# Patient Record
Sex: MALE | Race: OTHER | HISPANIC OR LATINO | URBAN - METROPOLITAN AREA
[De-identification: names, ages, dates, MRNs, and addresses within clinical notes are randomized per-mention and may not be internally consistent; named-entity substitution may affect disease eponyms.]

---

## 2020-07-24 ENCOUNTER — EMERGENCY (EMERGENCY)
Facility: HOSPITAL | Age: 20
LOS: 0 days | Discharge: HOME | End: 2020-07-24
Attending: EMERGENCY MEDICINE
Payer: MEDICAID

## 2020-07-24 VITALS
HEIGHT: 67 IN | DIASTOLIC BLOOD PRESSURE: 69 MMHG | TEMPERATURE: 98 F | HEART RATE: 102 BPM | RESPIRATION RATE: 18 BRPM | SYSTOLIC BLOOD PRESSURE: 156 MMHG | OXYGEN SATURATION: 99 %

## 2020-07-24 VITALS — WEIGHT: 180.12 LBS

## 2020-07-24 DIAGNOSIS — Z87.891 PERSONAL HISTORY OF NICOTINE DEPENDENCE: ICD-10-CM

## 2020-07-24 DIAGNOSIS — R07.9 CHEST PAIN, UNSPECIFIED: ICD-10-CM

## 2020-07-24 LAB
ALBUMIN SERPL ELPH-MCNC: 3.8 G/DL — SIGNIFICANT CHANGE UP (ref 3.3–5)
ALP SERPL-CCNC: 92 U/L — SIGNIFICANT CHANGE UP (ref 40–120)
ALT FLD-CCNC: 64 U/L — SIGNIFICANT CHANGE UP (ref 12–78)
ANION GAP SERPL CALC-SCNC: 9 MMOL/L — SIGNIFICANT CHANGE UP (ref 5–17)
AST SERPL-CCNC: 85 U/L — HIGH (ref 15–37)
BASOPHILS # BLD AUTO: 0.02 K/UL — SIGNIFICANT CHANGE UP (ref 0–0.2)
BASOPHILS NFR BLD AUTO: 0.2 % — SIGNIFICANT CHANGE UP (ref 0–2)
BILIRUB SERPL-MCNC: 0.3 MG/DL — SIGNIFICANT CHANGE UP (ref 0.2–1.2)
BUN SERPL-MCNC: 10 MG/DL — SIGNIFICANT CHANGE UP (ref 7–23)
CALCIUM SERPL-MCNC: 8.6 MG/DL — SIGNIFICANT CHANGE UP (ref 8.5–10.1)
CHLORIDE SERPL-SCNC: 104 MMOL/L — SIGNIFICANT CHANGE UP (ref 96–108)
CO2 SERPL-SCNC: 26 MMOL/L — SIGNIFICANT CHANGE UP (ref 22–31)
CREAT SERPL-MCNC: 1.13 MG/DL — SIGNIFICANT CHANGE UP (ref 0.5–1.3)
EOSINOPHIL # BLD AUTO: 0.29 K/UL — SIGNIFICANT CHANGE UP (ref 0–0.5)
EOSINOPHIL NFR BLD AUTO: 3.1 % — SIGNIFICANT CHANGE UP (ref 0–6)
GLUCOSE SERPL-MCNC: 139 MG/DL — HIGH (ref 70–99)
HCT VFR BLD CALC: 44.2 % — SIGNIFICANT CHANGE UP (ref 39–50)
HGB BLD-MCNC: 14.8 G/DL — SIGNIFICANT CHANGE UP (ref 13–17)
IMM GRANULOCYTES NFR BLD AUTO: 0.4 % — SIGNIFICANT CHANGE UP (ref 0–1.5)
LYMPHOCYTES # BLD AUTO: 3.99 K/UL — HIGH (ref 1–3.3)
LYMPHOCYTES # BLD AUTO: 43.1 % — SIGNIFICANT CHANGE UP (ref 13–44)
MAGNESIUM SERPL-MCNC: 2 MG/DL — SIGNIFICANT CHANGE UP (ref 1.6–2.6)
MCHC RBC-ENTMCNC: 28.8 PG — SIGNIFICANT CHANGE UP (ref 27–34)
MCHC RBC-ENTMCNC: 33.5 GM/DL — SIGNIFICANT CHANGE UP (ref 32–36)
MCV RBC AUTO: 86.2 FL — SIGNIFICANT CHANGE UP (ref 80–100)
MONOCYTES # BLD AUTO: 0.64 K/UL — SIGNIFICANT CHANGE UP (ref 0–0.9)
MONOCYTES NFR BLD AUTO: 6.9 % — SIGNIFICANT CHANGE UP (ref 2–14)
NEUTROPHILS # BLD AUTO: 4.28 K/UL — SIGNIFICANT CHANGE UP (ref 1.8–7.4)
NEUTROPHILS NFR BLD AUTO: 46.3 % — SIGNIFICANT CHANGE UP (ref 43–77)
PLATELET # BLD AUTO: 229 K/UL — SIGNIFICANT CHANGE UP (ref 150–400)
POTASSIUM SERPL-MCNC: 3.1 MMOL/L — LOW (ref 3.5–5.3)
POTASSIUM SERPL-SCNC: 3.1 MMOL/L — LOW (ref 3.5–5.3)
PROT SERPL-MCNC: 7.4 GM/DL — SIGNIFICANT CHANGE UP (ref 6–8.3)
RBC # BLD: 5.13 M/UL — SIGNIFICANT CHANGE UP (ref 4.2–5.8)
RBC # FLD: 12.1 % — SIGNIFICANT CHANGE UP (ref 10.3–14.5)
SARS-COV-2 RNA SPEC QL NAA+PROBE: SIGNIFICANT CHANGE UP
SODIUM SERPL-SCNC: 139 MMOL/L — SIGNIFICANT CHANGE UP (ref 135–145)
TROPONIN I SERPL-MCNC: <0.015 NG/ML — SIGNIFICANT CHANGE UP (ref 0.01–0.04)
TROPONIN I SERPL-MCNC: <0.015 NG/ML — SIGNIFICANT CHANGE UP (ref 0.01–0.04)
WBC # BLD: 9.26 K/UL — SIGNIFICANT CHANGE UP (ref 3.8–10.5)
WBC # FLD AUTO: 9.26 K/UL — SIGNIFICANT CHANGE UP (ref 3.8–10.5)

## 2020-07-24 PROCEDURE — 71046 X-RAY EXAM CHEST 2 VIEWS: CPT | Mod: 26

## 2020-07-24 PROCEDURE — U0003: CPT

## 2020-07-24 PROCEDURE — 93005 ELECTROCARDIOGRAM TRACING: CPT

## 2020-07-24 PROCEDURE — 80053 COMPREHEN METABOLIC PANEL: CPT

## 2020-07-24 PROCEDURE — 84484 ASSAY OF TROPONIN QUANT: CPT

## 2020-07-24 PROCEDURE — 71046 X-RAY EXAM CHEST 2 VIEWS: CPT

## 2020-07-24 PROCEDURE — 99284 EMERGENCY DEPT VISIT MOD MDM: CPT | Mod: 25

## 2020-07-24 PROCEDURE — 99284 EMERGENCY DEPT VISIT MOD MDM: CPT

## 2020-07-24 PROCEDURE — 83735 ASSAY OF MAGNESIUM: CPT

## 2020-07-24 PROCEDURE — 93010 ELECTROCARDIOGRAM REPORT: CPT

## 2020-07-24 PROCEDURE — 36415 COLL VENOUS BLD VENIPUNCTURE: CPT

## 2020-07-24 PROCEDURE — 99053 MED SERV 10PM-8AM 24 HR FAC: CPT

## 2020-07-24 PROCEDURE — 96374 THER/PROPH/DIAG INJ IV PUSH: CPT

## 2020-07-24 PROCEDURE — 85025 COMPLETE CBC W/AUTO DIFF WBC: CPT

## 2020-07-24 RX ORDER — KETOROLAC TROMETHAMINE 30 MG/ML
15 SYRINGE (ML) INJECTION ONCE
Refills: 0 | Status: DISCONTINUED | OUTPATIENT
Start: 2020-07-24 | End: 2020-07-24

## 2020-07-24 RX ORDER — POTASSIUM CHLORIDE 20 MEQ
40 PACKET (EA) ORAL ONCE
Refills: 0 | Status: COMPLETED | OUTPATIENT
Start: 2020-07-24 | End: 2020-07-24

## 2020-07-24 RX ADMIN — Medication 40 MILLIEQUIVALENT(S): at 05:42

## 2020-07-24 RX ADMIN — Medication 15 MILLIGRAM(S): at 02:36

## 2020-07-24 NOTE — ED PROVIDER NOTE - OBJECTIVE STATEMENT
19 y/o M with no pertinent PMHx p/w sudden onset of left sternal border chest pressure while laying supine and attempting to sleep.  Pt denies SOB or GONZALEZ.  Pt states pain is worse with deep breaths.  Pt denies recent illness, f/c/r, cough, abd pain, n/v/d.  Pt denies sick contacts or recent travel.  No leg pain/swelling.  Pt didn't take any meds for pain PTA.

## 2020-07-24 NOTE — ED ADULT TRIAGE NOTE - CHIEF COMPLAINT QUOTE
patient c/o chest pain, difficulty breathing, shaking, and feeling cold. symptoms started 30 minutes prior to arrival. no respiratory distress at triage. states he had similar symptoms one year ago.

## 2020-07-24 NOTE — ED ADULT NURSE NOTE - OBJECTIVE STATEMENT
pt states he developed some chest pain, chills and sob that started about 45 mins ago. pt states pain is in epigastric area and towards left side of chest. pt denies taking med pta

## 2020-07-24 NOTE — ED PROVIDER NOTE - CARE PROVIDER_API CALL
Iker Rincon  CARDIOVASCULAR DISEASE  [address] [place] [address]  Phone: [phone]  Fax: [phone]  Follow Up Time: 1-3 Days

## 2020-07-24 NOTE — ED PROVIDER NOTE - PATIENT PORTAL LINK FT
You can access the FollowMyHealth Patient Portal offered by [facility] by registering at the following website: [email or web address]. By joining [employer]’s FollowMyHealth portal, you will also be able to view your health information using other applications (apps) compatible with our system.

## 2020-07-24 NOTE — ED PROVIDER NOTE - NSFOLLOWUPINSTRUCTIONS_ED_ALL_ED_FT
Dolor de pecho inespecífico  Nonspecific Chest Pain    El dolor de pecho puede deberse a muchas afecciones diferentes. Siempre existe gertrudis posibilidad de que el dolor esté relacionado con algo grave, luan un infarto de miocardio o un coágulo sanguíneo en los pulmones. Hay diferentes afecciones que no son potencialmente mortales que pueden causar dolor de pecho. Si tiene dolor de pecho, es muy importante que se controle con el médico.     ¿Cuáles son las causas?  Entre las causas de esta afección se incluyen las siguientes:    Acidez estomacal.  Neumonía o bronquitis.  Ansiedad o estrés.  Inflamación de la leslie que rodea el corazón (pericarditis) o los pulmones (pleuritis o pleuresía).  Un coágulo sanguíneo en el pulmón.  Pulmón colapsado (neumotórax). Puede aparecer de manera repentina por sí solo (neumotórax espontáneo) o debido a un traumatismo en el tórax.  Culebrilla (virus de la varicela zóster).  Infarto de miocardio.  Daño de los huesos, los músculos y los cartílagos que conforman la pared torácica. [place] puede incluir lo siguiente:  Hematomas óseos debido a lesiones.  Distensiones musculares o de los cartílagos por tos frecuente o repetida, o por exceso de trabajo.  Fractura de gertrudis o más costillas.  Dolor de cartílago debido a inflamación (costocondritis).    ¿Qué incrementa el riesgo?  Entre los factores de riesgo de esta afección se pueden incluir los siguientes:    Actividades que incrementan el riesgo de sufrir traumatismos o lesiones en el tórax.  Infecciones o enfermedades respiratorias que causan tos frecuente.  Afecciones o excesos en las comidas que pueden causar acidez estomacal.  Cardiopatías coronarias o antecedentes familiares de enfermedades cardíacas.  Afecciones o comportamientos de milly que aumentan el riesgo de tener un coágulo sanguíneo.  Shubham tenido varicela (varicela zóster).    ¿Cuáles son los signos o los síntomas?  El dolor de pecho puede provocar las siguientes sensaciones:    Ardor u hormigueo en la superficie o en lo profundo del pecho.  Dolor opresivo, continuo o constrictivo.  Dolor vago o intenso que empeora al moverse, toser o inhalar profundamente.  Dolor que también se siente en la espalda, el elizabeth, el hombro o el brazo, o dolor que se extiende a cualquiera de estas zonas.    El dolor de pecho puede aparecer y desaparecer, o eryn puede ser carmen.    ¿Cómo se diagnostica?  Quizás se necesiten análisis de laboratorio u otros estudios para encontrar la causa del dolor. El médico puede indicarle que se liyah gertrudis prueba llamada ECG (electrocardiograma). El electrocardiograma registra los patrones de los latidos cardíacos en el momento en que se realiza el estudio. También pueden hacerle otros estudios, por ejemplo:    Ecocardiograma transtorácico (ETT). En margarette estudio, se usan ondas sonoras para crear gertrudis imagen de las estructuras cardíacas y evaluar cómo circula la kaylan por el corazón.  Ecocardiografía transesofágica (ETE). Margarette es un estudio de diagnóstico por imágenes más avanzado mediante el cual se williams imágenes del interior del cuerpo. Le permite al médico costa el corazón con mayor detalle.  Monitoreo cardíaco. Permite que el médico controle la frecuencia y el ritmo cardíacos en tiempo real.  Monitor Holter. Es un dispositivo portátil que registra los latidos del corazón y puede ayudar a diagnosticar los latidos cardíacos anómalos. Le permite al médico registrar la actividad cardíaca inder varios días, si es necesario.  Pruebas de esfuerzo. Estas pueden realizarse inder el ejercicio o mediante la administración de un medicamento que acelera los latidos del corazón.  Análisis de kaylan.  Otros estudios de diagnóstico por imágenes.    ¿Cómo se trata?  El tratamiento depende de la causa del dolor de pecho. El tratamiento puede incluir lo siguiente:    Medicamentos. Estos pueden incluir, entre otros, los siguientes:  Inhibidores de la acidez estomacal.  Antiinflamatorios.  Analgésicos para las enfermedades inflamatorias.  Antibióticos, si hay gertrudis infección.  Medicamentos para disolver los coágulos sanguíneos.  Medicamentos para tratar la enfermedad arterial coronaria (EAC).  Tratamiento complementario para las enfermedades que no requieren la chiqui de medicamentos. Algunas opciones de margarette tipo de tratamiento incluyen las siguientes:  Hacer reposo.  Aplicar compresas frías o calientes en las zonas lesionadas.  Limitar las actividades hasta que disminuya el dolor.    Siga estas indicaciones en fierro casa:  Medicamentos    Si le recetaron un antibiótico, tómelo luan se lo haya indicado el médico. No deje de lili el antibiótico aunque comience a sentirse mejor.  [place] los medicamentos de venta randy y los recetados solamente luan se lo haya indicado el médico.    Estilo de claribel    No consuma ningún producto que contenga nicotina o tabaco, luan cigarrillos y cigarrillos electrónicos. Si necesita ayuda para dejar de fumar, consulte al médico.  No saira alcohol.  Liyah cambios en fierro estilo de claribel luan se lo haya indicado el médico. Estos pueden incluir, entre otros, los siguientes:   Practicar actividad física con regularidad. Pida al médico que le sugiera algunas actividades que caden seguras para usted.  Consumir gertrudis dieta cardiosaludable. Un nutricionista matriculado puede ayudarlo a hacer elecciones saludables.  Mantener un peso saludable.  Controlar la diabetes, si es necesario.  Disminuir el nivel de estrés; por ejemplo, con yoga o técnicas de relajación.    Instrucciones generales    Evite las actividades que le causen dolor de pecho.  Si la causa del dolor de pecho es la acidez estomacal, levante (eleve) la cabecera de la cama aproximadamente 6 pulgadas (15 cm) colocando bloques debajo de las patas. Usar más almohadas para dormir no es efectivo para aliviar la acidez estomacal porque solo modificaría la posición de la gracia.  Concurra a todas las visitas de seguimiento luan se lo haya indicado el médico. [place] es importante. [place] incluye otros estudios si el dolor de pecho no desaparece.    Comuníquese con un médico si:  El dolor de pecho no desaparece.  Tiene gertrudis erupción cutánea con ampollas en el pecho.  Tiene fiebre.  Tiene escalofríos.    Solicite ayuda de inmediato si:  El dolor en el pecho es más intenso.  Tiene tos que empeora o tose con kaylan.  Siente un dolor intenso en el abdomen.  Siente debilidad intensa.  Se desmaya.  Tiene gertrudis molestia repentina e inexplicable en el pecho.  Tiene molestias repentinas e inexplicables en los brazos, la espalda, el elizabeth o la mandíbula.  Le falta el aire en cualquier momento.  Comienza a sudar de manera repentina o la piel se le humedece.  Siente náuseas o vomita.  Se siente repentinamente mareado o se desmaya.  Siente que el corazón comienza a latir rápidamente o que se saltea latidos.    Estos síntomas pueden representar un problema grave que constituye gertrudis emergencia. No espere hasta que los síntomas desaparezcan. Solicite atención médica de inmediato. Comuníquese con el servicio de emergencias de fierro localidad (911 en los [place]). No conduzca por johnny propios medios hasta el hospital.

## 2023-09-13 ENCOUNTER — EMERGENCY (EMERGENCY)
Facility: HOSPITAL | Age: 23
LOS: 0 days | Discharge: HOME | End: 2023-09-13
Attending: EMERGENCY MEDICINE
Payer: COMMERCIAL

## 2023-09-13 VITALS
SYSTOLIC BLOOD PRESSURE: 148 MMHG | RESPIRATION RATE: 18 BRPM | HEART RATE: 99 BPM | DIASTOLIC BLOOD PRESSURE: 103 MMHG | TEMPERATURE: 99 F | WEIGHT: 164.91 LBS | OXYGEN SATURATION: 96 % | HEIGHT: 68 IN

## 2023-09-13 VITALS
RESPIRATION RATE: 18 BRPM | TEMPERATURE: 98 F | HEART RATE: 77 BPM | OXYGEN SATURATION: 100 % | DIASTOLIC BLOOD PRESSURE: 68 MMHG | SYSTOLIC BLOOD PRESSURE: 118 MMHG
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
## 2023-09-13 DIAGNOSIS — R07.89 OTHER CHEST PAIN: ICD-10-CM

## 2023-09-13 DIAGNOSIS — S29.011A STRAIN OF MUSCLE AND TENDON OF FRONT WALL OF THORAX, INITIAL ENCOUNTER: ICD-10-CM
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
## 2023-09-13 PROBLEM — Z78.9 OTHER SPECIFIED HEALTH STATUS: Chronic | Status: ACTIVE | Noted: 2020-07-24

## 2023-09-13 PROCEDURE — 71045 X-RAY EXAM CHEST 1 VIEW: CPT

## 2023-09-13 PROCEDURE — 99053 MED SERV 10PM-8AM 24 HR FAC: CPT

## 2023-09-13 PROCEDURE — 99283 EMERGENCY DEPT VISIT LOW MDM: CPT | Mod: 25

## 2023-09-13 PROCEDURE — 99284 EMERGENCY DEPT VISIT MOD MDM: CPT

## 2023-09-13 PROCEDURE — 71045 X-RAY EXAM CHEST 1 VIEW: CPT | Mod: 26

## 2023-09-13 RX ORDER — IBUPROFEN 200 MG
600 TABLET ORAL ONCE
Refills: 0 | Status: COMPLETED | OUTPATIENT
Start: 2023-09-13 | End: 2023-09-13

## 2023-09-13 RX ORDER — ACETAMINOPHEN 500 MG
975 TABLET ORAL ONCE
Refills: 0 | Status: COMPLETED | OUTPATIENT
Start: 2023-09-13 | End: 2023-09-13

## 2023-09-13 RX ADMIN — Medication 975 MILLIGRAM(S): at 01:45

## 2023-09-13 RX ADMIN — Medication 600 MILLIGRAM(S): at 04:50

## 2023-09-13 NOTE — ED PROVIDER NOTE - PATIENT PORTAL LINK FT
You can access the FollowMyHealth Patient Portal offered by [facility] by registering at the following website: [email or web address]. By joining [employer]’s FollowMyHealth portal, you will also be able to view your health information using other applications (apps) compatible with our system.

## 2023-09-13 NOTE — ED PROVIDER NOTE - NSFOLLOWUPINSTRUCTIONS_ED_ALL_ED_FT
Paciente: GENER RIOSACOSTA  Profesional que asiste al paciente: Lalo, Silver J  Distensión muscular  Muscle Strain    Gertrudis distensión muscular es gertrudis lesión que se produce cuando un músculo se estira más allá de fierro maru normal. Cuando esto sucede, por lo general, se desgarra gertrudis pequeña cantidad de fibras musculares. Hay lorenzo tipos de distensiones musculares. Las distensiones de primer john son aquellas en las cuales el desgarro afecta a la yocasta cantidad de fibras musculares y las menos dolorosas. Las distensiones de garrett y tercer john involucran gertrudis proporción cada vez mayor de desgarro y dolor.    En general, la recuperación de gertrudis distensión muscular tarda de 1 a 2 semanas. La recuperación completa normalmente tarda de 5 a 6 semanas.    ¿Cuáles son las causas?  Esta afección ocurre cuando se aplica gertrudis fuerza violenta y súbita sobre un músculo y valeri se estira demasiado. [place] puede ocurrir inder gertrudis caída, cuando se levantan objetos o cuando se practican deportes.    ¿Qué incrementa el riesgo?  Es más probable que esta afección se manifieste en atletas y personas físicamente activas.    ¿Cuáles son los signos o síntomas?  Los síntomas de esta afección incluyen:    Dolor.  Moretones.  Hinchazón.  Dificultad cuando se usa el músculo.    ¿Cómo se diagnostica?  Esta afección se diagnostica en función de un examen físico y de los antecedentes médicos. También se pueden hacer estudios luan radiografía, ecografía o resonancia magnética (RM).    ¿Cómo se trata?  Inicialmente, se trata con terapia PRICE (protección, reposo, hielo, compresión, elevación). Esta terapia incluye lo siguiente:    Proteger al músculo de nuevas lesiones.  Reposo del músculo lesionado.  Aplicación de hielo en el músculo lesionado.  Aplicación de presión (compresión) en el músculo lesionado. [place] se puede hacer con gertrudis férula o gertrudis venda elástica.  Elevación del músculo lesionado.    El médico también puede recomendarle analgésicos.    Siga estas instrucciones en fierro casa:      Si tiene gertrudis férula:    Use la férula luan se lo haya indicado el médico. Quítesela solamente luan se lo haya indicado el médico.   Afloje la férula si los dedos de las shawna o de los pies se le entumecen, siente hormigueos o se le enfrían y se tornan de color mahendra.  Mantenga la férula limpia.  Si la férula no es impermeable:    No deje que se moje.  Cúbrala con un envoltorio hermético cuando tome un baño de inmersión o gertrudis ducha.        Control del dolor, la rigidez y la hinchazón     Si se lo indican, aplique hielo sobre la leslie de la lesión.    Si tiene gertrudis férula desmontable, quítesela luan se lo haya indicado el médico.  Ponga el hielo en gertrudis bolsa plástica.  Coloque gertrudis toalla entre la piel y la bolsa.  Aplique el hielo inder 20 minutos, 2 o 3 veces por día.  Mueva los dedos de la mano o del pie con frecuencia para evitar la rigidez y reducir la hinchazón.  Cuando esté sentado o acostado, alce (eleve) la leslie de la lesión por encima del nivel del corazón.  Use gertrudis venda elástica luan se lo haya indicado el médico. Asegúrese de no ajustarla demasiado.        Instrucciones generales    El tratamiento puede incluir medicamentos para el dolor y la inflamación que se williams por boca o que se aplican sobre la piel, analgésicos recetados o relajantes musculares. Use los medicamentos de venta randy y los recetados solamente luan se lo haya indicado el médico.  Restrinja las actividades y inna reposo del músculo lesionado según las indicaciones del médico. Posiblemente le permitan hacer movimientos suaves.  Si le indicaron fisioterapia, inna los ejercicios luan se lo haya indicado el médico.  No ejerza presión en ninguna parte de la férula hasta que se haya endurecido por completo. [place] puede tardar varias horas.  No consuma ningún producto que contenga nicotina o tabaco, luan cigarrillos y cigarrillos electrónicos. Estos pueden retrasar la consolidación del hueso. Si necesita ayuda para dejar de fumar, consulte al médico.  Pregúntele al médico cuándo puede volver a conducir si tiene gertrudis férula.  Concurra a todas las visitas de seguimiento luan se lo haya indicado el médico. [place] es importante.    ¿Cómo se ted?  Precaliente antes de la actividad física. [place] ayuda a prevenir futuras distensiones musculares.    Comuníquese con un médico si:  Siente más dolor o tiene más hinchazón en la leslie lesionada.    Solicite ayuda de inmediato si:  Siente adormecimiento, hormigueo o nota gertrudis pérdida importante de fuerza en la leslie lesionada.    Resumen  Gertrudis distensión muscular es gertrudis lesión que se produce cuando un músculo se estira más allá de fierro maru normal.  Esta afección ocurre cuando se aplica gertrudis fuerza violenta y súbita sobre un músculo y valeri se estira demasiado.  Esta afección se trata inicialmente con terapia PRICE, que significa protección, reposo, hielo, compresión y elevación.  Posiblemente le permitan hacer movimientos suaves. Si le indicaron fisioterapia, inna los ejercicios luan se lo haya indicado el médico.    NOTAS ADICIONALES E INSTRUCCIONES    Please follow up with your Primary MD in 24-48 hr.  Seek immediate medical care for any new/worsening signs or symptoms.     Document Released: 12/18/2006 Document Revised: 10/15/2020 Document Reviewed: 1/24/2018  [employer] Interactive Patient Education ©2019 [employer]. This information is not intended to replace advice given to you by your health care provider. Make sure you discuss any questions you have with your health care provider.

## 2023-09-13 NOTE — ED PROVIDER NOTE - CLINICAL SUMMARY MEDICAL DECISION MAKING FREE TEXT BOX
Patient no acute distress ambulating in the ED advised acetaminophen ibuprofen for pain no hemoptysis return to ED for intractable chest pain shortness of breath or any overall worsening
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
## 2023-09-13 NOTE — ED PROVIDER NOTE - CARE PLAN
1 Principal Discharge DX:	Strain of thoracic back region   Principal Discharge DX:	Strain of left pectoralis muscle

## 2023-10-27 ENCOUNTER — EMERGENCY (EMERGENCY)
Facility: HOSPITAL | Age: 23
LOS: 0 days | Discharge: HOME | End: 2023-10-27
Attending: HOSPITALIST
Payer: MEDICAID

## 2023-10-27 VITALS
RESPIRATION RATE: 18 BRPM | SYSTOLIC BLOOD PRESSURE: 128 MMHG | DIASTOLIC BLOOD PRESSURE: 97 MMHG | OXYGEN SATURATION: 97 % | TEMPERATURE: 98 F | HEART RATE: 76 BPM

## 2023-10-27 VITALS — HEIGHT: 68 IN | WEIGHT: 164.02 LBS

## 2023-10-27 DIAGNOSIS — R07.0 PAIN IN THROAT: ICD-10-CM

## 2023-10-27 DIAGNOSIS — R07.89 OTHER CHEST PAIN: ICD-10-CM

## 2023-10-27 DIAGNOSIS — Z20.822 CONTACT WITH AND (SUSPECTED) EXPOSURE TO COVID-19: ICD-10-CM

## 2023-10-27 DIAGNOSIS — R00.2 PALPITATIONS: ICD-10-CM

## 2023-10-27 LAB
ALBUMIN SERPL ELPH-MCNC: 3.5 G/DL — SIGNIFICANT CHANGE UP (ref 3.3–5)
ALBUMIN SERPL ELPH-MCNC: 3.5 G/DL — SIGNIFICANT CHANGE UP (ref 3.3–5)
ALP SERPL-CCNC: 86 U/L — SIGNIFICANT CHANGE UP (ref 40–120)
ALP SERPL-CCNC: 86 U/L — SIGNIFICANT CHANGE UP (ref 40–120)
ALT FLD-CCNC: 35 U/L — SIGNIFICANT CHANGE UP (ref 12–78)
ALT FLD-CCNC: 35 U/L — SIGNIFICANT CHANGE UP (ref 12–78)
ANION GAP SERPL CALC-SCNC: 6 MMOL/L — SIGNIFICANT CHANGE UP (ref 5–17)
ANION GAP SERPL CALC-SCNC: 6 MMOL/L — SIGNIFICANT CHANGE UP (ref 5–17)
AST SERPL-CCNC: 16 U/L — SIGNIFICANT CHANGE UP (ref 15–37)
AST SERPL-CCNC: 16 U/L — SIGNIFICANT CHANGE UP (ref 15–37)
BASOPHILS # BLD AUTO: 0.05 K/UL — SIGNIFICANT CHANGE UP (ref 0–0.2)
BASOPHILS # BLD AUTO: 0.05 K/UL — SIGNIFICANT CHANGE UP (ref 0–0.2)
BASOPHILS NFR BLD AUTO: 0.4 % — SIGNIFICANT CHANGE UP (ref 0–2)
BASOPHILS NFR BLD AUTO: 0.4 % — SIGNIFICANT CHANGE UP (ref 0–2)
BILIRUB SERPL-MCNC: 0.3 MG/DL — SIGNIFICANT CHANGE UP (ref 0.2–1.2)
BILIRUB SERPL-MCNC: 0.3 MG/DL — SIGNIFICANT CHANGE UP (ref 0.2–1.2)
BUN SERPL-MCNC: 9 MG/DL — SIGNIFICANT CHANGE UP (ref 7–23)
BUN SERPL-MCNC: 9 MG/DL — SIGNIFICANT CHANGE UP (ref 7–23)
CALCIUM SERPL-MCNC: 8.8 MG/DL — SIGNIFICANT CHANGE UP (ref 8.5–10.1)
CALCIUM SERPL-MCNC: 8.8 MG/DL — SIGNIFICANT CHANGE UP (ref 8.5–10.1)
CHLORIDE SERPL-SCNC: 104 MMOL/L — SIGNIFICANT CHANGE UP (ref 96–108)
CHLORIDE SERPL-SCNC: 104 MMOL/L — SIGNIFICANT CHANGE UP (ref 96–108)
CO2 SERPL-SCNC: 29 MMOL/L — SIGNIFICANT CHANGE UP (ref 22–31)
CO2 SERPL-SCNC: 29 MMOL/L — SIGNIFICANT CHANGE UP (ref 22–31)
CREAT SERPL-MCNC: 1.11 MG/DL — SIGNIFICANT CHANGE UP (ref 0.5–1.3)
CREAT SERPL-MCNC: 1.11 MG/DL — SIGNIFICANT CHANGE UP (ref 0.5–1.3)
D DIMER BLD IA.RAPID-MCNC: <150 NG/ML DDU — SIGNIFICANT CHANGE UP
D DIMER BLD IA.RAPID-MCNC: <150 NG/ML DDU — SIGNIFICANT CHANGE UP
EGFR: 95 ML/MIN/1.73M2 — SIGNIFICANT CHANGE UP
EGFR: 95 ML/MIN/1.73M2 — SIGNIFICANT CHANGE UP
EOSINOPHIL # BLD AUTO: 0.07 K/UL — SIGNIFICANT CHANGE UP (ref 0–0.5)
EOSINOPHIL # BLD AUTO: 0.07 K/UL — SIGNIFICANT CHANGE UP (ref 0–0.5)
EOSINOPHIL NFR BLD AUTO: 0.5 % — SIGNIFICANT CHANGE UP (ref 0–6)
EOSINOPHIL NFR BLD AUTO: 0.5 % — SIGNIFICANT CHANGE UP (ref 0–6)
FLUAV AG NPH QL: SIGNIFICANT CHANGE UP
FLUAV AG NPH QL: SIGNIFICANT CHANGE UP
FLUBV AG NPH QL: SIGNIFICANT CHANGE UP
FLUBV AG NPH QL: SIGNIFICANT CHANGE UP
GLUCOSE SERPL-MCNC: 122 MG/DL — HIGH (ref 70–99)
GLUCOSE SERPL-MCNC: 122 MG/DL — HIGH (ref 70–99)
HCT VFR BLD CALC: 48.7 % — SIGNIFICANT CHANGE UP (ref 39–50)
HCT VFR BLD CALC: 48.7 % — SIGNIFICANT CHANGE UP (ref 39–50)
HGB BLD-MCNC: 16.3 G/DL — SIGNIFICANT CHANGE UP (ref 13–17)
HGB BLD-MCNC: 16.3 G/DL — SIGNIFICANT CHANGE UP (ref 13–17)
IMM GRANULOCYTES NFR BLD AUTO: 1 % — HIGH (ref 0–0.9)
IMM GRANULOCYTES NFR BLD AUTO: 1 % — HIGH (ref 0–0.9)
LYMPHOCYTES # BLD AUTO: 1.94 K/UL — SIGNIFICANT CHANGE UP (ref 1–3.3)
LYMPHOCYTES # BLD AUTO: 1.94 K/UL — SIGNIFICANT CHANGE UP (ref 1–3.3)
LYMPHOCYTES # BLD AUTO: 14.4 % — SIGNIFICANT CHANGE UP (ref 13–44)
LYMPHOCYTES # BLD AUTO: 14.4 % — SIGNIFICANT CHANGE UP (ref 13–44)
MCHC RBC-ENTMCNC: 28.4 PG — SIGNIFICANT CHANGE UP (ref 27–34)
MCHC RBC-ENTMCNC: 28.4 PG — SIGNIFICANT CHANGE UP (ref 27–34)
MCHC RBC-ENTMCNC: 33.5 GM/DL — SIGNIFICANT CHANGE UP (ref 32–36)
MCHC RBC-ENTMCNC: 33.5 GM/DL — SIGNIFICANT CHANGE UP (ref 32–36)
MCV RBC AUTO: 85 FL — SIGNIFICANT CHANGE UP (ref 80–100)
MCV RBC AUTO: 85 FL — SIGNIFICANT CHANGE UP (ref 80–100)
MONOCYTES # BLD AUTO: 0.79 K/UL — SIGNIFICANT CHANGE UP (ref 0–0.9)
MONOCYTES # BLD AUTO: 0.79 K/UL — SIGNIFICANT CHANGE UP (ref 0–0.9)
MONOCYTES NFR BLD AUTO: 5.9 % — SIGNIFICANT CHANGE UP (ref 2–14)
MONOCYTES NFR BLD AUTO: 5.9 % — SIGNIFICANT CHANGE UP (ref 2–14)
NEUTROPHILS # BLD AUTO: 10.49 K/UL — HIGH (ref 1.8–7.4)
NEUTROPHILS # BLD AUTO: 10.49 K/UL — HIGH (ref 1.8–7.4)
NEUTROPHILS NFR BLD AUTO: 77.8 % — HIGH (ref 43–77)
NEUTROPHILS NFR BLD AUTO: 77.8 % — HIGH (ref 43–77)
PLATELET # BLD AUTO: 288 K/UL — SIGNIFICANT CHANGE UP (ref 150–400)
PLATELET # BLD AUTO: 288 K/UL — SIGNIFICANT CHANGE UP (ref 150–400)
POTASSIUM SERPL-MCNC: 3.8 MMOL/L — SIGNIFICANT CHANGE UP (ref 3.5–5.3)
POTASSIUM SERPL-MCNC: 3.8 MMOL/L — SIGNIFICANT CHANGE UP (ref 3.5–5.3)
POTASSIUM SERPL-SCNC: 3.8 MMOL/L — SIGNIFICANT CHANGE UP (ref 3.5–5.3)
POTASSIUM SERPL-SCNC: 3.8 MMOL/L — SIGNIFICANT CHANGE UP (ref 3.5–5.3)
PROT SERPL-MCNC: 7.8 GM/DL — SIGNIFICANT CHANGE UP (ref 6–8.3)
PROT SERPL-MCNC: 7.8 GM/DL — SIGNIFICANT CHANGE UP (ref 6–8.3)
RBC # BLD: 5.73 M/UL — SIGNIFICANT CHANGE UP (ref 4.2–5.8)
RBC # BLD: 5.73 M/UL — SIGNIFICANT CHANGE UP (ref 4.2–5.8)
RBC # FLD: 12.8 % — SIGNIFICANT CHANGE UP (ref 10.3–14.5)
RBC # FLD: 12.8 % — SIGNIFICANT CHANGE UP (ref 10.3–14.5)
RSV RNA NPH QL NAA+NON-PROBE: SIGNIFICANT CHANGE UP
RSV RNA NPH QL NAA+NON-PROBE: SIGNIFICANT CHANGE UP
S PYO AG SPEC QL IA: NEGATIVE — SIGNIFICANT CHANGE UP
S PYO AG SPEC QL IA: NEGATIVE — SIGNIFICANT CHANGE UP
SARS-COV-2 RNA SPEC QL NAA+PROBE: SIGNIFICANT CHANGE UP
SARS-COV-2 RNA SPEC QL NAA+PROBE: SIGNIFICANT CHANGE UP
SODIUM SERPL-SCNC: 139 MMOL/L — SIGNIFICANT CHANGE UP (ref 135–145)
SODIUM SERPL-SCNC: 139 MMOL/L — SIGNIFICANT CHANGE UP (ref 135–145)
TROPONIN I, HIGH SENSITIVITY RESULT: 6.23 NG/L — SIGNIFICANT CHANGE UP
TROPONIN I, HIGH SENSITIVITY RESULT: 6.23 NG/L — SIGNIFICANT CHANGE UP
TSH SERPL-MCNC: 2.16 UU/ML — SIGNIFICANT CHANGE UP (ref 0.34–4.82)
TSH SERPL-MCNC: 2.16 UU/ML — SIGNIFICANT CHANGE UP (ref 0.34–4.82)
WBC # BLD: 13.47 K/UL — HIGH (ref 3.8–10.5)
WBC # BLD: 13.47 K/UL — HIGH (ref 3.8–10.5)
WBC # FLD AUTO: 13.47 K/UL — HIGH (ref 3.8–10.5)
WBC # FLD AUTO: 13.47 K/UL — HIGH (ref 3.8–10.5)

## 2023-10-27 PROCEDURE — 71045 X-RAY EXAM CHEST 1 VIEW: CPT

## 2023-10-27 PROCEDURE — 96374 THER/PROPH/DIAG INJ IV PUSH: CPT

## 2023-10-27 PROCEDURE — 85025 COMPLETE CBC W/AUTO DIFF WBC: CPT

## 2023-10-27 PROCEDURE — 99285 EMERGENCY DEPT VISIT HI MDM: CPT | Mod: 25

## 2023-10-27 PROCEDURE — 84484 ASSAY OF TROPONIN QUANT: CPT

## 2023-10-27 PROCEDURE — 36415 COLL VENOUS BLD VENIPUNCTURE: CPT

## 2023-10-27 PROCEDURE — 87081 CULTURE SCREEN ONLY: CPT

## 2023-10-27 PROCEDURE — 80053 COMPREHEN METABOLIC PANEL: CPT

## 2023-10-27 PROCEDURE — 93010 ELECTROCARDIOGRAM REPORT: CPT

## 2023-10-27 PROCEDURE — 71045 X-RAY EXAM CHEST 1 VIEW: CPT | Mod: 26

## 2023-10-27 PROCEDURE — 85379 FIBRIN DEGRADATION QUANT: CPT

## 2023-10-27 PROCEDURE — 99285 EMERGENCY DEPT VISIT HI MDM: CPT

## 2023-10-27 PROCEDURE — 0241U: CPT

## 2023-10-27 PROCEDURE — 84443 ASSAY THYROID STIM HORMONE: CPT

## 2023-10-27 PROCEDURE — 93005 ELECTROCARDIOGRAM TRACING: CPT

## 2023-10-27 PROCEDURE — 87880 STREP A ASSAY W/OPTIC: CPT

## 2023-10-27 RX ORDER — DEXAMETHASONE 0.5 MG/5ML
10 ELIXIR ORAL ONCE
Refills: 0 | Status: COMPLETED | OUTPATIENT
Start: 2023-10-27 | End: 2023-10-27

## 2023-10-27 RX ADMIN — Medication 102 MILLIGRAM(S): at 09:12
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
## 2023-10-27 NOTE — ED PROVIDER NOTE - PHYSICAL EXAMINATION
***GEN - NAD; well appearing; A+O x3 ***HEAD - NC/AT ***EYES/NOSE - PERRL, EOMI, mucous membranes moist, no discharge ***THROAT: Oral cavity and pharynx normal. No inflammation, swelling, exudate, or lesions.  ***NECK: Neck supple, non-tender   ***PULMONARY - CTA b/l, symmetric breath sounds. ***CARDIAC -s1s2, RRR, no M,G,R  ***ABDOMEN - +BS, ND, NT, soft  ***BACK - no CVA tenderness, Normal  spine ***EXTREMITIES - symmetric pulses, 2+ dp, capillary refill < 2 seconds ***SKIN - no rash or bruising   ***NEUROLOGIC - alert, CN 2-12 intact  ***PSYCH - insight and judgment nl, memory nl, affect nl, thought nl
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
## 2023-10-27 NOTE — ED ADULT NURSE NOTE - NSFALLUNIVINTERV_ED_ALL_ED
Bed/Stretcher in lowest position, wheels locked, appropriate side rails in place/Call bell, personal items and telephone in reach/Instruct patient to call for assistance before getting out of bed/chair/stretcher/Non-slip footwear applied when patient is off stretcher/[place] to call system/Physically safe environment - no spills, clutter or unnecessary equipment/Purposeful proactive rounding/Room/bathroom lighting operational, light cord in reach
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
## 2023-10-27 NOTE — ED PROVIDER NOTE - PATIENT PORTAL LINK FT
You can access the FollowMyHealth Patient Portal offered by [facility] by registering at the following website: [email or web address]. By joining [email or web address]’s FollowMyHealth portal, you will also be able to view your health information using other applications (apps) compatible with our system.

## 2023-10-27 NOTE — ED PROVIDER NOTE - NSFOLLOWUPINSTRUCTIONS_ED_ALL_ED_FT
Please call and follow up with your doctor in 1-3 days.    Return to the Emergency Department for worsening or persistent symptoms, and/or ANY NEW OR CONCERNING SYMPTOMS. If you have issues obtaining follow up, please call: [phone] or [phone]  to obtain a doctor or specialist who takes your insurance in your area.    Llame y nina un seguimiento con fierro médico en 1 a 3 días.    Regrese al Departamento de Emergencias si los síntomas empeoran o persisten, y/o CUALQUIER SÍNTOMA NUEVO O PREOCUPANTE. Si tiene problemas para obtener un seguimiento, llame al: [phone] ([phone] o al [phone] para obtener un médico o especialista que acepte fierro seguro en fierro área.      Palpitaciones cardíacas    LO QUE NECESITA SABER:    Las palpitaciones cardíacas son sensaciones que se perciben luan aceleraciones, maira, latidos o aleteos del corazón. También podría sentir latidos adicionales, que fierro corazón no late por un corto periodo de tiempo o que se saltean los latidos. Puede percibir estas sensaciones en el pecho, la garganta o el elizabeth. Pueden presentarse cuando está sentado, de pie o acostado. Las palpitaciones cardíacas pueden causar temor; sin embargo, generalmente no se deben a un problema importante.    INSTRUCCIONES SOBRE EL RICCARDO HOSPITALARIA:    Llame al 911 o pídale a alguien más que llame en cualquiera de los siguientes casos:    Tiene alguno de los siguientes signos de un ataque cardíaco:  Estrujamiento, presión o tensión en fierro pecho    Usted también podría presentar alguno de los siguientes:  Malestar o dolor en fierro espalda, elizabeth, mandíbula, abdomen, o brazo    Falta de aliento    Náuseas o vómitos    Desvanecimiento o sudor frío repentino    Usted tiene alguno de los siguientes signos de derrame cerebral:  Adormecimiento o caída de un lado de fierro divya    Debilidad en un brazo o gertrudis pierna    Confusión o debilidad para hablar    Mareos o dolor de gracia intenso, o pérdida de la visión.    Usted se desmaya o pierde el conocimiento.  Busque atención médica de inmediato si:    Johnny palpitaciones ocurren con más frecuencia o nails más de lo habitual.    Tiene palpitaciones y le falta el aliento, tiene sudoración, náuseas o mareos.  Comuníquese con fierro médico si:    Usted tiene preguntas o inquietudes acerca de fierro condición o cuidado.    Acuda a johnny consultas de control con fierro médico según le indicaron.Es posible que necesite un seguimiento con un cardiólogo. Hero vez deba hacerse exámenes para determinar si sufre problemas cardíacos que le causan las palpitaciones. Anote johnny preguntas para que se acuerde de hacerlas inder johnny visitas.    Mantenga un registro:Escriba cuándo johnny palpitaciones comienzan y terminan, qué estaba usted haciendo cuando comenzaron y johnny síntomas. Mantenga un registro de lo que usted comió o tomó inder las horas antes de johnny palpitaciones. Incluya todo lo que aparentemente le ayudó a que johnny síntomas mejoraran luan acostarse o contener fierro respiración. [place] registro le ayudará a usted y a fierro médico para saber qué provoca johnny palpitaciones. Lleve el registro con usted a johnny citas de seguimiento.    Cómo ayudar a prevenir las palpitaciones cardíacas:    Controlar el estrés y la ansiedad.Encuentre formas de relajarse, luan escuchar música, meditar o hacer yoga. El ejercicio también puede ayudar a disminuir el estrés y la ansiedad. Hablar con alguien de confianza acerca de fierro estrés o ansiedad. También puede hablar con un psicoterapeuta.    Duerma lo suficiente cada la noche.Pregunte a fierro médico cuánto debería dormir usted cada noche.    No tome bebidas con cafeína o alcohol.La cafeína y el alcohol pueden hacer que johnny palpitaciones empeoren. La cafeína se encuentra en refrescos, café, té, chocolate y bebidas que aumentan fierro energía.    No fume.La nicotina y otros químicos en los cigarrillos y cigarros podrían provocar daño a fierro corazón y a johnny vasos sanguíneos. Pida información a fierro médico si usted actualmente fuma y necesita ayuda para dejar de fumar. Los cigarrillos electrónicos o el tabaco sin humo igualmente contienen nicotina. Consulte con fierro médico antes de utilizar estos productos.    No use drogas ilegales.Hable con fierro médico si consume drogas ilegales y quiere dejarlas.  © [employer] 1973, 2023

## 2023-10-27 NOTE — ED PROVIDER NOTE - OBJECTIVE STATEMENT
23-year-old male (Kyrgyz speaking –translation provided by MOLLY Valdovinos) who reports an episode this morning while driving of tightness in his upper abdomen/lower chest area and then a feeling of a ball or tightness in his throat.  Also felt like his heart was racing during this time and his throat felt very tight and difficult to swallow.  Denies any difficulty breathing.  Denies any anxiety or history of panic disorder.  States that his symptoms are improving however have not completely improved at this time.  Also reports that he has had 3 similar episodes in the past and has come to the emergency department however no etiology of his symptoms have been found.  Despite chief complaint of "flulike symptoms" patient denies any cough, shortness of breath, headache, neck pain,body aches or fever.

## 2024-08-20 NOTE — ED ADULT NURSE NOTE - NS ED NURSE LEVEL OF CONSCIOUSNESS SPEECH
----- Message from Gabriel Redd MD sent at 8/20/2024 10:09 AM CDT -----  Referral to Dr. Ashley currie  
Speaking Coherently

## 2024-11-06 ENCOUNTER — EMERGENCY (EMERGENCY)
Facility: HOSPITAL | Age: 24
LOS: 0 days | Discharge: HOME | End: 2024-11-06
Attending: STUDENT IN AN ORGANIZED HEALTH CARE EDUCATION/TRAINING PROGRAM
Payer: MEDICAID

## 2024-11-06 VITALS
OXYGEN SATURATION: 97 % | TEMPERATURE: 98 F | DIASTOLIC BLOOD PRESSURE: 72 MMHG | RESPIRATION RATE: 19 BRPM | SYSTOLIC BLOOD PRESSURE: 132 MMHG | HEART RATE: 77 BPM

## 2024-11-06 VITALS
TEMPERATURE: 98 F | OXYGEN SATURATION: 99 % | HEART RATE: 65 BPM | RESPIRATION RATE: 17 BRPM | SYSTOLIC BLOOD PRESSURE: 121 MMHG | DIASTOLIC BLOOD PRESSURE: 64 MMHG

## 2024-11-06 DIAGNOSIS — R53.83 OTHER FATIGUE: ICD-10-CM

## 2024-11-06 DIAGNOSIS — R42 DIZZINESS AND GIDDINESS: ICD-10-CM

## 2024-11-06 DIAGNOSIS — R07.89 OTHER CHEST PAIN: ICD-10-CM

## 2024-11-06 DIAGNOSIS — R06.02 SHORTNESS OF BREATH: ICD-10-CM

## 2024-11-06 LAB
ALBUMIN SERPL ELPH-MCNC: 3.9 G/DL — SIGNIFICANT CHANGE UP (ref 3.3–5)
ALP SERPL-CCNC: 90 U/L — SIGNIFICANT CHANGE UP (ref 40–120)
ALT FLD-CCNC: 25 U/L — SIGNIFICANT CHANGE UP (ref 12–78)
ANION GAP SERPL CALC-SCNC: 3 MMOL/L — LOW (ref 5–17)
AST SERPL-CCNC: 15 U/L — SIGNIFICANT CHANGE UP (ref 15–37)
BASOPHILS # BLD AUTO: 0.05 K/UL — SIGNIFICANT CHANGE UP (ref 0–0.2)
BASOPHILS NFR BLD AUTO: 0.5 % — SIGNIFICANT CHANGE UP (ref 0–2)
BILIRUB SERPL-MCNC: 0.2 MG/DL — SIGNIFICANT CHANGE UP (ref 0.2–1.2)
BUN SERPL-MCNC: 15 MG/DL — SIGNIFICANT CHANGE UP (ref 7–23)
CALCIUM SERPL-MCNC: 9.2 MG/DL — SIGNIFICANT CHANGE UP (ref 8.5–10.1)
CHLORIDE SERPL-SCNC: 108 MMOL/L — SIGNIFICANT CHANGE UP (ref 96–108)
CO2 SERPL-SCNC: 28 MMOL/L — SIGNIFICANT CHANGE UP (ref 22–31)
CREAT SERPL-MCNC: 1.41 MG/DL — HIGH (ref 0.5–1.3)
D DIMER BLD IA.RAPID-MCNC: <150 NG/ML DDU — SIGNIFICANT CHANGE UP
EGFR: 71 ML/MIN/1.73M2 — SIGNIFICANT CHANGE UP
EOSINOPHIL # BLD AUTO: 0.26 K/UL — SIGNIFICANT CHANGE UP (ref 0–0.5)
EOSINOPHIL NFR BLD AUTO: 2.7 % — SIGNIFICANT CHANGE UP (ref 0–6)
GLUCOSE SERPL-MCNC: 96 MG/DL — SIGNIFICANT CHANGE UP (ref 70–99)
HCT VFR BLD CALC: 46.5 % — SIGNIFICANT CHANGE UP (ref 39–50)
HGB BLD-MCNC: 15.6 G/DL — SIGNIFICANT CHANGE UP (ref 13–17)
IMM GRANULOCYTES NFR BLD AUTO: 0.4 % — SIGNIFICANT CHANGE UP (ref 0–0.9)
LYMPHOCYTES # BLD AUTO: 3.2 K/UL — SIGNIFICANT CHANGE UP (ref 1–3.3)
LYMPHOCYTES # BLD AUTO: 32.8 % — SIGNIFICANT CHANGE UP (ref 13–44)
MAGNESIUM SERPL-MCNC: 2 MG/DL — SIGNIFICANT CHANGE UP (ref 1.6–2.6)
MCHC RBC-ENTMCNC: 28.6 PG — SIGNIFICANT CHANGE UP (ref 27–34)
MCHC RBC-ENTMCNC: 33.5 G/DL — SIGNIFICANT CHANGE UP (ref 32–36)
MCV RBC AUTO: 85.2 FL — SIGNIFICANT CHANGE UP (ref 80–100)
MONOCYTES # BLD AUTO: 0.76 K/UL — SIGNIFICANT CHANGE UP (ref 0–0.9)
MONOCYTES NFR BLD AUTO: 7.8 % — SIGNIFICANT CHANGE UP (ref 2–14)
NEUTROPHILS # BLD AUTO: 5.45 K/UL — SIGNIFICANT CHANGE UP (ref 1.8–7.4)
NEUTROPHILS NFR BLD AUTO: 55.8 % — SIGNIFICANT CHANGE UP (ref 43–77)
NT-PROBNP SERPL-SCNC: 11 PG/ML — SIGNIFICANT CHANGE UP (ref 0–125)
PLATELET # BLD AUTO: 280 K/UL — SIGNIFICANT CHANGE UP (ref 150–400)
POTASSIUM SERPL-MCNC: 4 MMOL/L — SIGNIFICANT CHANGE UP (ref 3.5–5.3)
POTASSIUM SERPL-SCNC: 4 MMOL/L — SIGNIFICANT CHANGE UP (ref 3.5–5.3)
PROT SERPL-MCNC: 8.1 GM/DL — SIGNIFICANT CHANGE UP (ref 6–8.3)
RBC # BLD: 5.46 M/UL — SIGNIFICANT CHANGE UP (ref 4.2–5.8)
RBC # FLD: 12.5 % — SIGNIFICANT CHANGE UP (ref 10.3–14.5)
SODIUM SERPL-SCNC: 139 MMOL/L — SIGNIFICANT CHANGE UP (ref 135–145)
TROPONIN I, HIGH SENSITIVITY RESULT: 4.5 NG/L — SIGNIFICANT CHANGE UP
WBC # BLD: 9.76 K/UL — SIGNIFICANT CHANGE UP (ref 3.8–10.5)
WBC # FLD AUTO: 9.76 K/UL — SIGNIFICANT CHANGE UP (ref 3.8–10.5)

## 2024-11-06 PROCEDURE — 85025 COMPLETE CBC W/AUTO DIFF WBC: CPT

## 2024-11-06 PROCEDURE — 84484 ASSAY OF TROPONIN QUANT: CPT

## 2024-11-06 PROCEDURE — 83880 ASSAY OF NATRIURETIC PEPTIDE: CPT

## 2024-11-06 PROCEDURE — 71046 X-RAY EXAM CHEST 2 VIEWS: CPT

## 2024-11-06 PROCEDURE — 71046 X-RAY EXAM CHEST 2 VIEWS: CPT | Mod: 26

## 2024-11-06 PROCEDURE — 83735 ASSAY OF MAGNESIUM: CPT

## 2024-11-06 PROCEDURE — 93005 ELECTROCARDIOGRAM TRACING: CPT

## 2024-11-06 PROCEDURE — 99284 EMERGENCY DEPT VISIT MOD MDM: CPT

## 2024-11-06 PROCEDURE — 99285 EMERGENCY DEPT VISIT HI MDM: CPT | Mod: 25

## 2024-11-06 PROCEDURE — 93010 ELECTROCARDIOGRAM REPORT: CPT

## 2024-11-06 PROCEDURE — 80053 COMPREHEN METABOLIC PANEL: CPT

## 2024-11-06 PROCEDURE — 36415 COLL VENOUS BLD VENIPUNCTURE: CPT

## 2024-11-06 PROCEDURE — 36000 PLACE NEEDLE IN VEIN: CPT

## 2024-11-06 PROCEDURE — 85379 FIBRIN DEGRADATION QUANT: CPT

## 2024-11-06 NOTE — ED ADULT TRIAGE NOTE - CHIEF COMPLAINT QUOTE
patient ambulatory to triage complains of persistent generalized chest pain and dizziness throughout day. no respiratory distress

## 2024-11-06 NOTE — ED STATDOCS - PROGRESS NOTE DETAILS
25 y/o male w/ no PMHx Pt is presenting to the ED c/o chest pain and SOB for the past couple months. Pt reports that he was lying down when he felt a pressure on his chest, SOB, and dizziness. Pt reports that shortly before when he was eating earlier today, he felt a his chest begin to hurt. Pt also mentions that he was seen multiple times for a similar complaint, but was told that he is okay each time. Pt currently endorses dizziness and generalized fatigue. Pt denies decreased p/o intake, and lifting any heavy objects recently. Pt mentions that this is his fourth day on an Doxycycline, which he taking due to possible Chlamydia transmission.   Daisy Allison PA-C Labs, CXR all unremarkable.  Discussed all results with patient and to follow with [facility].  Return precautions discussed.   # [id number]

## 2024-11-06 NOTE — ED ADULT NURSE NOTE - OBJECTIVE STATEMENT
Pt presents to ed c/o of chest pain starting tonight. Pt pain is located in the middle of his chest denies radiation at this time. Pt denies difficulty breathing and pmhx of cardiac issues.

## 2024-11-06 NOTE — ED STATDOCS - NSFOLLOWUPCLINICS_GEN_ALL_ED_FT
[facility] at [place] [id number] [place] [address]  Phone: [phone]  Fax:

## 2024-11-06 NOTE — ED STATDOCS - CLINICAL SUMMARY MEDICAL DECISION MAKING FREE TEXT BOX
24-year-old male presenting with intermittent left-sided chest pain associated with some shortness of breath.  States he has been evaluated in the ED multiple times for similar without any positive findings.  Differential including but not limited to: MSK, costochondritis, less likely pneumothorax, ACS, PE, pericarditis, other.  Will evaluate with blood work, chest x-ray, symptomatic treatment, reassess.

## 2024-11-06 NOTE — ED STATDOCS - OBJECTIVE STATEMENT
25 y/o male w/ no PMHx Pt is presenting to the ED c/o chest pain and SOB for the past couple months. Pt reports that he was lying down when he felt a pressure on his chest, SOB, and dizziness. Pt reports that shortly before when he was eating earlier today, he felt a his chest begin to hurt. Pt also mentions that he was seen multiple times for a similar complaint, but was told that he is okay each time. Pt currently endorses dizziness and generalized fatigue. Pt denies decreased p/o intake, and lifting any heavy objects recently. Pt mentions that this is his fourth day on an Doxycycline, which he taking due to possible Chlamydia transmission.      Number: [id number] 23 y/o male w/ no PMHx Pt is presenting to the ED c/o chest pain and SOB for the past couple months. Pt reports that he was lying down when he felt a pressure on his chest, SOB, and dizziness. Pt reports that shortly before when he was eating , he felt a his chest begin to hurt as well. Pt also mentions that he was seen multiple times for a similar complaint, but was told that he is okay each time. Pt currently endorses dizziness and generalized fatigue. Pt denies decreased p/o intake, and lifting any heavy objects recently. Pt mentions that this is his fourth day on an Doxycycline, which he taking due to possible Chlamydia transmission.      Number: [id number]

## 2024-11-06 NOTE — ED STATDOCS - PATIENT PORTAL LINK FT
You can access the FollowMyHealth Patient Portal offered by [facility] by registering at the following website: [email or web address]. By joining [employer]’s FollowMyHealth portal, you will also be able to view your health information using other applications (apps) compatible with our system.

## 2025-01-13 ENCOUNTER — EMERGENCY (EMERGENCY)
Facility: HOSPITAL | Age: 25
LOS: 0 days | Discharge: HOME | End: 2025-01-13
Attending: EMERGENCY MEDICINE
Payer: MEDICAID

## 2025-01-13 VITALS
TEMPERATURE: 98 F | WEIGHT: 210.1 LBS | OXYGEN SATURATION: 100 % | SYSTOLIC BLOOD PRESSURE: 131 MMHG | RESPIRATION RATE: 18 BRPM | DIASTOLIC BLOOD PRESSURE: 74 MMHG | HEART RATE: 74 BPM

## 2025-01-13 VITALS
DIASTOLIC BLOOD PRESSURE: 89 MMHG | RESPIRATION RATE: 16 BRPM | HEART RATE: 77 BPM | SYSTOLIC BLOOD PRESSURE: 145 MMHG | TEMPERATURE: 98 F | OXYGEN SATURATION: 100 %

## 2025-01-13 DIAGNOSIS — R51.9 HEADACHE, UNSPECIFIED: ICD-10-CM

## 2025-01-13 DIAGNOSIS — J10.1 INFLUENZA DUE TO OTHER IDENTIFIED INFLUENZA VIRUS WITH OTHER RESPIRATORY MANIFESTATIONS: ICD-10-CM

## 2025-01-13 DIAGNOSIS — R53.1 WEAKNESS: ICD-10-CM

## 2025-01-13 DIAGNOSIS — B34.9 VIRAL INFECTION, UNSPECIFIED: ICD-10-CM

## 2025-01-13 DIAGNOSIS — R06.02 SHORTNESS OF BREATH: ICD-10-CM

## 2025-01-13 LAB
FLUAV AG NPH QL: DETECTED
FLUBV AG NPH QL: SIGNIFICANT CHANGE UP
RSV RNA NPH QL NAA+NON-PROBE: SIGNIFICANT CHANGE UP
SARS-COV-2 RNA SPEC QL NAA+PROBE: SIGNIFICANT CHANGE UP

## 2025-01-13 PROCEDURE — 0241U: CPT

## 2025-01-13 PROCEDURE — 99283 EMERGENCY DEPT VISIT LOW MDM: CPT

## 2025-01-13 PROCEDURE — 99284 EMERGENCY DEPT VISIT MOD MDM: CPT

## 2025-01-13 RX ORDER — GUAIFENESIN/DEXTROMETHORPHAN 200-10MG/5
1 LIQUID (ML) ORAL
Qty: 10 | Refills: 0
Start: 2025-01-13 | End: 2025-01-17

## 2025-01-13 RX ORDER — PSEUDOEPHEDRINE HCL 30 MG
60 TABLET ORAL ONCE
Refills: 0 | Status: COMPLETED | OUTPATIENT
Start: 2025-01-13 | End: 2025-01-13

## 2025-01-13 RX ORDER — IBUPROFEN 200 MG
800 TABLET ORAL ONCE
Refills: 0 | Status: COMPLETED | OUTPATIENT
Start: 2025-01-13 | End: 2025-01-13

## 2025-01-13 RX ORDER — IBUPROFEN 200 MG
1 TABLET ORAL
Qty: 15 | Refills: 0
Start: 2025-01-13

## 2025-01-13 RX ADMIN — Medication 60 MILLIGRAM(S): at 03:00

## 2025-01-13 RX ADMIN — Medication 800 MILLIGRAM(S): at 02:39

## 2025-01-13 NOTE — ED ADULT NURSE NOTE - CHIEF COMPLAINT QUOTE
pt ambulatory to ED, c/o headache and neck cramping beginning 30min pta. denies injury/trauma. pt is a&ox4, speaking in full and complete sentences without difficulty. -pmh, -BEFAST,

## 2025-01-13 NOTE — ED PROVIDER NOTE - NSFOLLOWUPCLINICS_GEN_ALL_ED_FT
[facility] at [place] [id number] [place] [address]  Phone: [phone]  Fax:

## 2025-01-13 NOTE — ED PROVIDER NOTE - ENMT, MLM
Airway patent, Nasal mucosa slightly erythematous; Mouth with normal moist mucosa. No intraoral lesions. Throat has no vesicles, no oropharyngeal exudates and uvula is midline. no sinus tenderness

## 2025-01-13 NOTE — ED ADULT NURSE NOTE - OBJECTIVE STATEMENT
Pt presents to the ED from home c/o headache, flu like symptoms, neck cramps, and congestion. Pt does not appear in respiratory distress. A&Ox4 and ambulatory.

## 2025-01-13 NOTE — ED PROVIDER NOTE - PATIENT PORTAL LINK FT
You can access the FollowMyHealth Patient Portal offered by [facility] by registering at the following website: [email or web address]. By joining [employer]’s FollowMyHealth portal, you will also be able to view your health information using other applications (apps) compatible with our system.

## 2025-01-13 NOTE — ED ADULT TRIAGE NOTE - CHIEF COMPLAINT QUOTE
pt ambulatory to ED, c/o headache and neck cramping. endorsed this began 30min pta. pt is a&ox4, speaking in full and complete sentences without difficulty. -pmh, -BEFAST pt ambulatory to ED, c/o headache and neck cramping beginning 30min pta. denies injury/trauma. pt is a&ox4, speaking in full and complete sentences without difficulty. -pmh, -BEFAST pt ambulatory to ED, c/o headache and neck cramping beginning 30min pta. denies injury/trauma. pt is a&ox4, speaking in full and complete sentences without difficulty. -pmh, -BEFAST,

## 2025-01-13 NOTE — ED PROVIDER NOTE - OBJECTIVE STATEMENT
Pt. is a 25 yo M without any medical problems presents with headache X 30 minutes.  Patient states he has pain all over head and face, body aches, and feels like he has the flu.  He has nasal congestion but denies ear pain, sore throat or coughing.  No meds taken prior to arrival. No sick contacts at home.  No vomiting or diarrhea. Denies visual changes, arm or leg weakness or numbness.

## 2025-01-13 NOTE — ED PROVIDER NOTE - NSFOLLOWUPINSTRUCTIONS_ED_ALL_ED_FT
Take tylenol 650mg every 4-6 hours as needed for fever or headache.    Take ibuprofen 600mg every 6 hours as needed for severe headache or body pain.  Keep well hydrated.  See your doctor within 5 days.  Take mucinex or robitussin as needed for cough and congestion.    Irish    Gripe en los adultos  Influenza, Adult  A la gripe también se la conoce luan influenza. Es gertrudis infección que afecta las vías respiratorias. Estas incluyen la nariz, la garganta, la tráquea y los pulmones.    La gripe es contagiosa. [place] significa que se transmite fácilmente de gertrudis persona a otra. Causa síntomas que son luan un resfrío. También puede provocar fiebre sidra y rosemarie corporales.    ¿Cuáles son las causas?  La gripe es causada por el virus de la influenza. Puede contraerlo de las siguientes maneras:  Al inhalar las gotitas que quedan en el aire después de que gertrudis persona infectada tose o estornuda.  Al tocar algo que está contaminado con el virus y luego llevarse la mano a la boca, la nariz o los ojos.  ¿Qué incrementa el riesgo?  Puede ser más propenso a contraer gripe si:  No se lava las shawna con frecuencia.  Está cerca de muchas personas inder la temporada de resfrío y gripe.  Se toca la boca, los ojos o la nariz sin antes lavarse las shawna.  No recibe la vacuna antigripal todos los años.  También puede correr un mayor riesgo de tener gripe y problemas graves, luan gertrudis infección pulmonar llamada neumonía, si:  Tiene más de 65 años.  Está embarazada.  Fierro sistema inmunitario está débil. Fierro sistema inmunitario es el sistema de defensa de fierro cuerpo.  Tiene gertrudis afección a maru plazo, o crónica, luan:  Enfermedad pulmonar, cardíaca o renal.  Diabetes.  Un trastorno del hígado.  Asma.  Tiene sobrepeso.  Tiene anemia. La Dolores ocurre cuando no tiene suficientes glóbulos rojos en el cuerpo.  ¿Cuáles son los signos o síntomas?  Los síntomas de la gripe suelen aparecer de repente. Pueden durar entre 4 y 14 días e incluir lo siguiente:  Fiebre y escalofríos.  Rosemarie de gracia, rosemarie en el cuerpo o rosemarie musculares.  Dolor de garganta.  Tos.  Secreción o congestión nasal.  Molestias en el pecho.  No querer comer tanto luan lo hace normalmente.  Sensación de debilidad o cansancio.  Sensación de mareo.  Náuseas o vómitos.  ¿Cómo se diagnostica?  La gripe puede diagnosticarse en función de los síntomas y los antecedentes médicos. También pueden hacerle un examen físico. Es posible que le pj un hisopado de la nariz o la garganta para detectar el virus.    ¿Cómo se trata?  Si la gripe se detecta de forma temprana, puede recibir tratamiento con medicamentos antivirales. Estos se pueden administrar por boca o a través de gertrudis vía intravenosa (i.v.). Pueden ayudarlo a sentirse menos enfermo y a mejorar más rápido.    Cuidarse en fierro hogar también puede ayudar a que mejoren johnny síntomas. El médico puede indicarle que:  [place] medicamentos de venta randy.  Jamila mucho líquido.  La gripe suele desaparecer ignacia. Si tiene síntomas muy graves o problemas provocados por la gripe, es posible que necesite recibir tratamiento en un hospital.    Siga estas instrucciones en fierro casa:  Actividad    Descanse todo lo que sea necesario. Duerma mucho.  Quédese en fierro casa y no concurra al trabajo o a la escuela, luan se lo haya indicado fierro médico.  Salga de fierro casa solo para ir al médico.  No salga de fierro casa por otros motivos hasta que no haya tenido fiebre por 24 horas sin lili medicamentos.  Comida y bebida    [place] gertrudis solución de rehidratación oral (oral rehydration solution, ORS). Es gertrudis bebida que se vende en farmacias y tiendas.  Jamila suficiente líquido luan para mantener la orina de color amarillo pálido.  Trate de beber pequeñas cantidades de líquidos braydon. Estos incluyen agua, cubitos de hielo, jugo de fruta rebajado con agua y bebidas deportivas bajas en calorías.  Trate de comer alimentos suaves que caden fáciles de digerir. Estos incluyen bananas, compota de manzana, arroz, ana magras, tostadas y galletas saladas.  Evite las bebidas con alto contenido de azúcar o cafeína. Estas incluyen las bebidas energéticas, las bebidas deportivas comunes y los refrescos.  No jamila alcohol.  No coma alimentos grasos o muy condimentados.  Instrucciones generales    A person covering their mouth and nose with a cloth while sneezing or coughing.  Washing hands with soap and water.  Use los medicamentos solamente luan se lo haya indicado el médico.  Use un humidificador de aire frío para que el aire de fierro casa esté más húmedo. [place] Dol[place] puede facilitar la respiración. También debe limpiar el humidificador todos los días. Para ello:  Vacíe el agua.  Vierta agua limpia.  Al toser o estornudar, cúbrase la boca y la nariz.  Lávese las shawna frecuentemente con agua y jabón y inder al menos 20 segundos. Es sumamente importante que lo inna después de toser o estornudar. Si no dispone de agua y jabón, use un desinfectante para shawna.  ¿Cómo se previene?  A person receiving an injection in the upper arm.  Colóquese la vacuna antigripal todos los años. Pregúntele al médico cuándo debe recibir la vacuna contra la gripe.  Manténgase alejado de las personas que están enfermas inder el otoño y el invierno. El otoño y el invierno son la temporada de los resfríos y la gripe.  Comuníquese con un médico si:  Tiene síntomas nuevos.  Tiene dolor en el pecho.  Tiene heces líquidas, también llamado diarrea.  Tiene fiebre.  La tos empeora.  Empieza a tener más mucosidad.  Tiene ganas de vomitar o vomita.  Solicite ayuda de inmediato si:  Le falta el aire o tiene problemas para respirar.  Observa que la piel o las uñas están azules.  Presenta un dolor intenso o rigidez en el elizabeth.  Tiene un dolor de gracia repentino o tiene dolor en la divya o el oído.  Vomita cada vez que come o selvin.  Estos síntomas pueden indicar gertrudis emergencia. Llame al 911 de inmediato.  No espere a costa si los síntomas desaparecen.  No conduzca por johnny propios medios hasta el hospital.  Esta información no tiene luan fin reemplazar el consejo del médico. Asegúrese de hacerle al médico cualquier pregunta que tenga.    Document Revised: 03/28/2024 Document Reviewed: 01/27/2024  Elsevier Patient Education © 2024 Elsevier Inc.  Elsevier logo  Terms and Conditions  Privacy Policy  Editorial Policy  All content on this site: Copyright © 2025 Elsevier, its licensors, and contributors. All rights are reserved, including those for text and data mining, AI training, and similar technologies. For all open access content, the Creative Commons licensing terms apply.  Cookies are used by this site. To decline or learn more, visit our Cookies page.  [employer]

## 2025-02-24 ENCOUNTER — EMERGENCY (EMERGENCY)
Facility: HOSPITAL | Age: 25
LOS: 0 days | Discharge: HOME | End: 2025-02-24
Attending: EMERGENCY MEDICINE
Payer: MEDICAID

## 2025-02-24 VITALS
DIASTOLIC BLOOD PRESSURE: 91 MMHG | SYSTOLIC BLOOD PRESSURE: 141 MMHG | WEIGHT: 211.64 LBS | HEART RATE: 81 BPM | OXYGEN SATURATION: 100 % | RESPIRATION RATE: 18 BRPM | TEMPERATURE: 97 F

## 2025-02-24 VITALS
OXYGEN SATURATION: 99 % | RESPIRATION RATE: 16 BRPM | DIASTOLIC BLOOD PRESSURE: 79 MMHG | TEMPERATURE: 98 F | SYSTOLIC BLOOD PRESSURE: 142 MMHG | HEART RATE: 79 BPM

## 2025-02-24 DIAGNOSIS — R07.89 OTHER CHEST PAIN: ICD-10-CM

## 2025-02-24 DIAGNOSIS — M54.9 DORSALGIA, UNSPECIFIED: ICD-10-CM

## 2025-02-24 LAB
ALBUMIN SERPL ELPH-MCNC: 3.9 G/DL — SIGNIFICANT CHANGE UP (ref 3.3–5)
ALP SERPL-CCNC: 85 U/L — SIGNIFICANT CHANGE UP (ref 40–120)
ALT FLD-CCNC: 28 U/L — SIGNIFICANT CHANGE UP (ref 12–78)
ANION GAP SERPL CALC-SCNC: 3 MMOL/L — LOW (ref 5–17)
APTT BLD: 34.2 SEC — SIGNIFICANT CHANGE UP (ref 24.5–35.6)
AST SERPL-CCNC: 14 U/L — LOW (ref 15–37)
BASOPHILS # BLD AUTO: 0.03 K/UL — SIGNIFICANT CHANGE UP (ref 0–0.2)
BASOPHILS NFR BLD AUTO: 0.3 % — SIGNIFICANT CHANGE UP (ref 0–2)
BILIRUB SERPL-MCNC: 0.3 MG/DL — SIGNIFICANT CHANGE UP (ref 0.2–1.2)
BUN SERPL-MCNC: 9 MG/DL — SIGNIFICANT CHANGE UP (ref 7–23)
CALCIUM SERPL-MCNC: 9.3 MG/DL — SIGNIFICANT CHANGE UP (ref 8.5–10.1)
CHLORIDE SERPL-SCNC: 104 MMOL/L — SIGNIFICANT CHANGE UP (ref 96–108)
CO2 SERPL-SCNC: 29 MMOL/L — SIGNIFICANT CHANGE UP (ref 22–31)
CREAT SERPL-MCNC: 1.05 MG/DL — SIGNIFICANT CHANGE UP (ref 0.5–1.3)
EGFR: 100 ML/MIN/1.73M2 — SIGNIFICANT CHANGE UP
EOSINOPHIL # BLD AUTO: 0.15 K/UL — SIGNIFICANT CHANGE UP (ref 0–0.5)
EOSINOPHIL NFR BLD AUTO: 1.5 % — SIGNIFICANT CHANGE UP (ref 0–6)
GLUCOSE SERPL-MCNC: 96 MG/DL — SIGNIFICANT CHANGE UP (ref 70–99)
HCT VFR BLD CALC: 50.8 % — HIGH (ref 39–50)
HGB BLD-MCNC: 16.9 G/DL — SIGNIFICANT CHANGE UP (ref 13–17)
IMM GRANULOCYTES # BLD AUTO: 0.06 K/UL — SIGNIFICANT CHANGE UP (ref 0–0.07)
IMM GRANULOCYTES NFR BLD AUTO: 0.6 % — SIGNIFICANT CHANGE UP (ref 0–0.9)
INR BLD: 0.98 RATIO — SIGNIFICANT CHANGE UP (ref 0.85–1.16)
LYMPHOCYTES # BLD AUTO: 2.8 K/UL — SIGNIFICANT CHANGE UP (ref 1–3.3)
LYMPHOCYTES NFR BLD AUTO: 27.3 % — SIGNIFICANT CHANGE UP (ref 13–44)
MCHC RBC-ENTMCNC: 28 PG — SIGNIFICANT CHANGE UP (ref 27–34)
MCHC RBC-ENTMCNC: 33.3 G/DL — SIGNIFICANT CHANGE UP (ref 32–36)
MCV RBC AUTO: 84.2 FL — SIGNIFICANT CHANGE UP (ref 80–100)
MONOCYTES # BLD AUTO: 0.67 K/UL — SIGNIFICANT CHANGE UP (ref 0–0.9)
MONOCYTES NFR BLD AUTO: 6.5 % — SIGNIFICANT CHANGE UP (ref 2–14)
NEUTROPHILS # BLD AUTO: 6.53 K/UL — SIGNIFICANT CHANGE UP (ref 1.8–7.4)
NEUTROPHILS NFR BLD AUTO: 63.8 % — SIGNIFICANT CHANGE UP (ref 43–77)
NRBC # BLD AUTO: 0 K/UL — SIGNIFICANT CHANGE UP (ref 0–0)
NRBC # FLD: 0 K/UL — SIGNIFICANT CHANGE UP (ref 0–0)
NRBC BLD AUTO-RTO: 0 /100 WBCS — SIGNIFICANT CHANGE UP (ref 0–0)
PLATELET # BLD AUTO: 286 K/UL — SIGNIFICANT CHANGE UP (ref 150–400)
PMV BLD: 9.8 FL — SIGNIFICANT CHANGE UP (ref 7–13)
POTASSIUM SERPL-MCNC: 3.6 MMOL/L — SIGNIFICANT CHANGE UP (ref 3.5–5.3)
POTASSIUM SERPL-SCNC: 3.6 MMOL/L — SIGNIFICANT CHANGE UP (ref 3.5–5.3)
PROT SERPL-MCNC: 8 GM/DL — SIGNIFICANT CHANGE UP (ref 6–8.3)
PROTHROM AB SERPL-ACNC: 11.3 SEC — SIGNIFICANT CHANGE UP (ref 9.9–13.4)
RBC # BLD: 6.03 M/UL — HIGH (ref 4.2–5.8)
RBC # FLD: 12.4 % — SIGNIFICANT CHANGE UP (ref 10.3–14.5)
SODIUM SERPL-SCNC: 136 MMOL/L — SIGNIFICANT CHANGE UP (ref 135–145)
TROPONIN I, HIGH SENSITIVITY RESULT: 5.23 NG/L — SIGNIFICANT CHANGE UP
WBC # BLD: 10.24 K/UL — SIGNIFICANT CHANGE UP (ref 3.8–10.5)
WBC # FLD AUTO: 10.24 K/UL — SIGNIFICANT CHANGE UP (ref 3.8–10.5)

## 2025-02-24 PROCEDURE — 93005 ELECTROCARDIOGRAM TRACING: CPT

## 2025-02-24 PROCEDURE — 80053 COMPREHEN METABOLIC PANEL: CPT

## 2025-02-24 PROCEDURE — 99285 EMERGENCY DEPT VISIT HI MDM: CPT

## 2025-02-24 PROCEDURE — 71045 X-RAY EXAM CHEST 1 VIEW: CPT

## 2025-02-24 PROCEDURE — 85610 PROTHROMBIN TIME: CPT

## 2025-02-24 PROCEDURE — 96374 THER/PROPH/DIAG INJ IV PUSH: CPT

## 2025-02-24 PROCEDURE — 85730 THROMBOPLASTIN TIME PARTIAL: CPT

## 2025-02-24 PROCEDURE — 85025 COMPLETE CBC W/AUTO DIFF WBC: CPT

## 2025-02-24 PROCEDURE — 99285 EMERGENCY DEPT VISIT HI MDM: CPT | Mod: 25

## 2025-02-24 PROCEDURE — 93010 ELECTROCARDIOGRAM REPORT: CPT

## 2025-02-24 PROCEDURE — 84484 ASSAY OF TROPONIN QUANT: CPT

## 2025-02-24 PROCEDURE — 36415 COLL VENOUS BLD VENIPUNCTURE: CPT

## 2025-02-24 PROCEDURE — 71045 X-RAY EXAM CHEST 1 VIEW: CPT | Mod: 26

## 2025-02-24 RX ORDER — KETOROLAC TROMETHAMINE 10 MG
15 TABLET ORAL ONCE
Refills: 0 | Status: DISCONTINUED | OUTPATIENT
Start: 2025-02-24 | End: 2025-02-24

## 2025-02-24 RX ADMIN — Medication 15 MILLIGRAM(S): at 04:45

## 2025-02-24 NOTE — ED ADULT TRIAGE NOTE - CHIEF COMPLAINT QUOTE
Pt ambulatory to ed c/o of chest pain radiating to left jaw and down left arm for x2 day. Pt endorses shortness of breath, and difficulty sleeping. Respirations are even and unlabored. NO meds taken PTA. NO known cardiac hx. Pt taken for STAT EKG.

## 2025-02-24 NOTE — ED PROVIDER NOTE - IV ALTEPLASE EXCL ABS HIDDEN
RCVD' notice from pharmacy that FreeStyle Lite Test Strips are not covered by her insurance. [facility] covers One Touch or True Metrix. Please send in for new device & supplies. show

## 2025-02-24 NOTE — ED PROVIDER NOTE - NSFOLLOWUPINSTRUCTIONS_ED_ALL_ED_FT
Dolor de pecho inespecífico  Nonspecific Chest Pain    El dolor de pecho puede deberse a muchas afecciones diferentes. Existe gertrudis posibilidad de que el dolor esté relacionado con algo grave, luan un ataque cardíaco o un coágulo sanguíneo en los pulmones. Hay muchas afecciones que no son potencialmente mortales que pueden causar dolor de pecho. Si tiene dolor de pecho, es muy importante que concurra a las visitas de seguimiento con el médico.    Siga estas indicaciones en fierro casa:  Medicamentos    Si le recetaron un antibiótico, tómelo luan se lo haya indicado el médico. No deje de lili el antibiótico aunque comience a sentirse mejor.  [place] los medicamentos de venta randy y los recetados solamente luan se lo haya indicado el médico.    Estilo de claribel    No consuma ningún producto que contenga nicotina o tabaco, luan cigarrillos y cigarrillos electrónicos. Si necesita ayuda para dejar de fumar, consulte al médico.  No saira alcohol.  Inna cambios en fierro estilo de claribel según las indicaciones del médico. Estos pueden incluir lo siguiente:   Practicar actividad física con regularidad. Pídale al médico que le sugiera algunas actividades que caden seguras para usted.  Consumir gertrudis dieta cardiosaludable. Un especialista en alimentación (nutricionista) puede ayudarlo a que inna elecciones saludables.  Mantener un peso saludable.  Controlar la diabetes, si es necesario.  Disminuir el estrés, por ejemplo, con técnicas de respiración profunda o contacto con la naturaleza.    Instrucciones generales    Evite las actividades que le causen dolor de pecho.  Si el dolor de pecho se debe a acidez estomacal:  Levante (eleve) la cabecera de la cama aproximadamente 6 pulgadas (15 cm). Para ello, coloque bloques debajo de las patas de la cama, en la cabecera.  No duerma con almohadas adicionales debajo de la gracia. [place] no ayuda a aliviar la acidez estomacal.  Concurra a todas las visitas de seguimiento luan se lo haya indicado el médico. [place] es importante. [place] incluye otros estudios si el dolor de pecho no desaparece.    Comuníquese con un médico si:  El dolor de pecho no desaparece.  Tiene gertrudis erupción cutánea con ampollas en el pecho.  Tiene fiebre.  Tiene escalofríos.    Solicite ayuda de inmediato si:  El dolor en el pecho es más intenso.  Tiene tos que empeora o tose con kaylan.   Tiene dolor muy intenso en el vientre (abdomen).  Se siente muy débil.  Pierde el conocimiento (se desmaya).  Tiene cualquiera de estos síntomas sin ninguna causa sarah:  Malestar repentino en el pecho.  Molestias repentinas en los brazos, la espalda, el elizabeth o la mandíbula.  Le falta el aire en cualquier momento.  Comienza a sudar de manera repentina o la piel se le humedece.  Siente malestar estomacal (náuseas).  Vomita.  Se siente repentinamente mareado o se desmaya.  El corazón comienza a latirle rápidamente o parece que se saltea latidos.    Estos síntomas pueden indicar gertrudis emergencia. No espere hasta que los síntomas desaparezcan. Solicite atención médica de inmediato. Comuníquese con el servicio de emergencias de fierro localidad (911 en los [place]). No conduzca por johnny propios medios hasta el hospital.

## 2025-02-24 NOTE — ED PROVIDER NOTE - PATIENT PORTAL LINK FT
Clothing
You can access the FollowMyHealth Patient Portal offered by [facility] by registering at the following website: [email or web address]. By joining [employer]’s FollowMyHealth portal, you will also be able to view your health information using other applications (apps) compatible with our system.

## 2025-02-24 NOTE — ED ADULT NURSE NOTE - OBJECTIVE STATEMENT
Pt ambulatory to ed c/o of chest pain radiating to left jaw and down left arm for 2x day. Pt endorsing SOB upon exertion. Pt denies  N/V/D, fever/chills, dizziness, pain. no other complaints at this time. safety and comfort measures maintained. md at bedside
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
## 2025-02-24 NOTE — ED PROVIDER NOTE - PHYSICAL EXAMINATION
CONSTITUTIONAL: Well appearing, awake, alert, oriented to person, place, time/situation and in no apparent distress.  · ENMT: Airway patent, Nasal mucosa clear. Mouth with normal mucosa. Throat has no vesicles, no oropharyngeal exudates and uvula is midline.  · EYES: Clear bilaterally, pupils equal, round and reactive to light.  · CARDIAC: Normal rate, regular rhythm.  Heart sounds S1, S2.  No murmurs, rubs or gallops.  · RESPIRATORY: Breath sounds clear and equal bilaterally.  · GASTROINTESTINAL: Abdomen soft, non-tender, no guarding.  · MUSCULOSKELETAL: Spine appears normal, range of motion is not limited,  tenderness to palpation of left trapezius and left pectoralis.  Pain reproduced with flexion of the left pectoralis  · NEUROLOGICAL: Alert and oriented, no focal deficits, no motor or sensory deficits.  · SKIN: Skin normal color for race, warm, dry and intact. No evidence of rash

## 2025-04-25 ENCOUNTER — EMERGENCY (EMERGENCY)
Facility: HOSPITAL | Age: 25
LOS: 0 days | Discharge: HOME | End: 2025-04-25
Attending: STUDENT IN AN ORGANIZED HEALTH CARE EDUCATION/TRAINING PROGRAM
Payer: COMMERCIAL

## 2025-04-25 VITALS
SYSTOLIC BLOOD PRESSURE: 143 MMHG | DIASTOLIC BLOOD PRESSURE: 83 MMHG | OXYGEN SATURATION: 100 % | HEART RATE: 84 BPM | RESPIRATION RATE: 18 BRPM | TEMPERATURE: 98 F
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
## 2025-04-25 DIAGNOSIS — M25.571 PAIN IN RIGHT ANKLE AND JOINTS OF RIGHT FOOT: ICD-10-CM

## 2025-04-25 PROCEDURE — 73610 X-RAY EXAM OF ANKLE: CPT | Mod: RT

## 2025-04-25 PROCEDURE — 99284 EMERGENCY DEPT VISIT MOD MDM: CPT | Mod: 25

## 2025-04-25 PROCEDURE — 73610 X-RAY EXAM OF ANKLE: CPT | Mod: 26,RT

## 2025-04-25 PROCEDURE — 99284 EMERGENCY DEPT VISIT MOD MDM: CPT

## 2025-04-25 PROCEDURE — 96374 THER/PROPH/DIAG INJ IV PUSH: CPT

## 2025-04-25 RX ORDER — KETOROLAC TROMETHAMINE 30 MG/ML
30 INJECTION, SOLUTION INTRAMUSCULAR; INTRAVENOUS ONCE
Refills: 0 | Status: DISCONTINUED | OUTPATIENT
Start: 2025-04-25 | End: 2025-04-25

## 2025-04-25 RX ORDER — ACETAMINOPHEN 500 MG/5ML
975 LIQUID (ML) ORAL ONCE
Refills: 0 | Status: COMPLETED | OUTPATIENT
Start: 2025-04-25 | End: 2025-04-25

## 2025-04-25 RX ADMIN — Medication 975 MILLIGRAM(S): at 22:33

## 2025-04-25 RX ADMIN — KETOROLAC TROMETHAMINE 30 MILLIGRAM(S): 30 INJECTION, SOLUTION INTRAMUSCULAR; INTRAVENOUS at 22:28

## 2025-04-25 NOTE — ED PROVIDER NOTE - PHYSICAL EXAMINATION
Constitutional: NAD, alert, verbal  HEENT: NCAT, EOMi, PERRL  Cardiac: RRR no MRG  Resp: clear, no wheezing or crackles  GI: ab soft ntnd, no r/g  MSK/Ext: no edema  Neuro: MARIE  Skin: No rashes Constitutional: NAD, alert, verbal  HEENT: NCAT, EOMi, PERRL  Cardiac: RRR no MRG  Resp: clear, no wheezing or crackles  GI: ab soft ntnd, no r/g  MSK/Ext: right ankle with minimal lateral mal swelling and tenderness  Neuro: MARIE  Skin: No rashes

## 2025-04-25 NOTE — ED ADULT NURSE NOTE - AS PAIN REST
Writer updated pt son Leyla Somers via telephone. Leyla Somers agrees to  patients wheelchair at the security office at [facility]. Patient and son agreeable to plan of care to transfer to [facility]. 5 (moderate pain)
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
## 2025-04-25 NOTE — ED PROVIDER NOTE - PATIENT PORTAL LINK FT
You can access the FollowMyHealth Patient Portal offered by [facility] by registering at the following website: [email or web address]. By joining [employer]’s FollowMyHealth portal, you will also be able to view your health information using other applications (apps) compatible with our system.
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
## 2025-04-25 NOTE — ED PROVIDER NOTE - NSFOLLOWUPINSTRUCTIONS_ED_ALL_ED_FT
Return to the Emergency Department for worsening or persistent symptoms, and/or ANY NEW OR CONCERNING SYMPTOMS. If you have issues obtaining follow up, please call: [phone] or [phone]  to obtain a doctor or specialist who takes your insurance in your area. Dolor en el tobillo  Ankle Pain  La articulación del tobillo lo ayuda a pararse sobre la pierna y le permite moverse. El dolor en el tobillo puede ocurrir en cualquiera de los lados o en la parte posterior del tobillo. Puede sentir dolor en kiko o en ambos tobillos. Puede sentir un dolor sourav que causa sensación de ardor, o puede ser un dolor sordo y molesto. Puede elicia sensibilidad al tacto, rigidez, enrojecimiento o sensación de calor alrededor del tobillo.    El dolor en el tobillo puede tener muchas causas. Estas incluyen gertrudis lesión en la leslie y el uso excesivo del tobillo.    Siga estas indicaciones en fierro casa:  Actividad    Ponga el tobillo en reposo luan se lo haya indicado el médico. Evite hacer cosas que le causen dolor en el tobillo.  No apoye el peso del cuerpo sobre la extremidad lesionada hasta que lo autorice el médico. Use las muletas luan se lo haya indicado el médico.  Pregúntele al médico cuándo puede volver a conducir vehículos si tiene un dispositivo ortopédico en el tobillo.  Liyah los ejercicios luan se lo haya indicado el médico.  Si tiene un dispositivo ortopédico extraíble:    Use el dispositivo ortopédico luan se lo haya indicado el médico. Quíteselo solamente luan se lo haya indicado el médico.  Controle todos los mónica la piel alrededor del dispositivo ortopédico. Informe al médico acerca de cualquier inquietud.  Aflójelo si los dedos de los pies se le adormecen, siente hormigueos o se le enfrían y se tornan de color mahendra.  Mantenga limpio el dispositivo ortopédico.  Si el dispositivo ortopédico no es impermeable:  No deje que se moje.  Cúbralo con un envoltorio hermético cuando tome un baño de inmersión o gertrudis ducha.  Si tiene gertrudis venda elástica:    A person wrapping a bandage around an ankle.  Quítesela para bañarse.  Intente no  mucho el tobillo. Mueva los dedos de los pies de vez en cuando. Aspen ayuda a evitar la hinchazón.  Acomode la venda si la siente demasiado ajustada.  Afloje la venda si siente hormigueos en el pie, o si el pie se le entumece o si se le enfría y se torna de color mahendra.  Control del dolor, la rigidez y la hinchazón    Bag of ice on a towel on the skin.  Si se lo indican, aplique hielo sobre la leslie dolorida.  Si tiene un dispositivo ortopédico desmontable o gertrudis venda elástica, quíteselos luan se lo haya indicado el médico.  Ponga el hielo en gertrudis bolsa plástica.  Coloque gertrudis toalla entre la piel y la bolsa.  Aplique el hielo inder 20 minutos, 2 a 3 veces por día.  Si la piel se le pone de color nunez brillante, retire el hielo de inmediato para evitar daños en la piel. El riesgo de daño es mayor si no puede sentir dolor, calor o frío.  Mueva los dedos del pie con frecuencia para reducir la rigidez y la hinchazón.  Cuando esté sentado o acostado, levante (eleve) el tobillo por encima del nivel del corazón.  Indicaciones generales    Use los medicamentos de venta randy y los recetados solamente luan se lo haya indicado el médico.  Para ayudarlos a usted y a fierro médico, anote lo siguiente:  Con qué frecuencia le duele el tobillo.  Dónde siente el dolor.  Cómo se siente el dolor.  Si le indican que use un determinado zapato o plantilla, asegúrese de usarlo correctamente y inder el tiempo que le indiquen.  Comuníquese con un médico si:  El dolor empeora.  El dolor no mejora con medicamentos.  Tiene fiebre o escalofríos.  Tiene más dificultad para caminar.  Aparecen nuevos síntomas.  El pie, la pierna, los dedos del pie o el tobillo presentan hormigueos, se le adormecen, se le hinchan, o se le enfrían y se tornan de color mahendra.
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
